# Patient Record
Sex: MALE | Race: WHITE | NOT HISPANIC OR LATINO | Employment: UNEMPLOYED | URBAN - METROPOLITAN AREA
[De-identification: names, ages, dates, MRNs, and addresses within clinical notes are randomized per-mention and may not be internally consistent; named-entity substitution may affect disease eponyms.]

---

## 2023-02-23 ENCOUNTER — TELEPHONE (OUTPATIENT)
Dept: FAMILY MEDICINE | Facility: CLINIC | Age: 63
End: 2023-02-23

## 2023-02-23 ENCOUNTER — VIRTUAL VISIT (OUTPATIENT)
Dept: FAMILY MEDICINE | Facility: CLINIC | Age: 63
End: 2023-02-23
Payer: MEDICAID

## 2023-02-23 DIAGNOSIS — R79.82 ELEVATED C-REACTIVE PROTEIN (CRP): ICD-10-CM

## 2023-02-23 DIAGNOSIS — M25.50 ARTHRALGIA, UNSPECIFIED JOINT: Primary | ICD-10-CM

## 2023-02-23 PROCEDURE — 99212 OFFICE O/P EST SF 10 MIN: CPT | Mod: VID | Performed by: FAMILY MEDICINE

## 2023-02-23 RX ORDER — NAPROXEN SODIUM 220 MG
220 TABLET ORAL 2 TIMES DAILY
COMMUNITY

## 2023-02-23 RX ORDER — LEVOTHYROXINE SODIUM 75 UG/1
75 TABLET ORAL DAILY
COMMUNITY
Start: 2023-02-09

## 2023-02-23 RX ORDER — IBUPROFEN 200 MG
400 TABLET ORAL EVERY 6 HOURS PRN
COMMUNITY

## 2023-02-23 NOTE — PROGRESS NOTES
Eder is a 62 year old who is being evaluated via a billable video visit.      How would you like to obtain your AVS? Mail a copy  If the video visit is dropped, the invitation should be resent by: Text to cell phone: [phone]  Will anyone else be joining your video visit? No          Assessment & Plan     Arthralgia, unspecified joint  Question palindromic rheumatism, seronegative RA  Referral to rheumatology    Elevated C-reactive protein (CRP)                     No follow-ups on file.    Derrick Lynch MD  [facility]    Carolyne Gaines is a 62 year old accompanied by his self, presenting for the following health issues:  Establish Care (Verbal consent given for video visit.  Establish care, discuss referral)      HPI     Patient wants to establish care.  He is seeking a referral to [facility] to see a rheumatologist.  He was being seen by Dr. Drew Hunt who originally placed a referral but [facility] declined it stating that Dr. Hunt isn't in their system.  He did have workup at the [facility] in [place], [place].    Patient has recent history of migratory arthralgias.  He has had a work-up done through [facility] in [facility].  His CRP is elevated.  There is also a diagnosis of subclinical hypothyroidism.  He is requesting referral to rheumatology.  This has been placed in the [facility] system but there is some troubles with insurance.      Review of Systems   Constitutional, HEENT, cardiovascular, pulmonary, gi and gu systems are negative, except as otherwise noted.      Objective           Vitals:  No vitals were obtained today due to virtual visit.    Physical Exam   GENERAL: Healthy, alert and no distress  EYES: Eyes grossly normal to inspection.  No discharge or erythema, or obvious scleral/conjunctival abnormalities.  RESP: No audible wheeze, cough, or visible cyanosis.  No visible retractions or increased work of breathing.    SKIN: Visible skin clear. No significant rash,  abnormal pigmentation or lesions.  NEURO: Cranial nerves grossly intact.  Mentation and speech appropriate for age.  PSYCH: Mentation appears normal, affect normal/bright, judgement and insight intact, normal speech and appearance well-groomed.                Video-Visit Details    Type of service:  Video Visit     Originating Location (pt. Location): Home    Distant Location (provider location):  On-site  Platform used for Video Visit: Brianne

## 2023-02-23 NOTE — TELEPHONE ENCOUNTER
03/23/23  Order/Referral Request    Who is requesting: Pt     Orders being requested: Rheumatology Referral    Reason service is needed/diagnosis: Pt has a referral placed but it is internal and pt wants this to be sent to [facility] in [place] - [phone]    When are orders needed by: asap, March 02 is patients appt with [facility]    Has this been discussed with Provider: Yes    Does patient have a preference on a Group/Provider/Facility? [facility] in [place]    Does patient have an appointment scheduled?: Yes: 03/02 at [facility]    Where to send orders: Fax    Okay to leave a detailed message?: Yes at Home number on file [phone] (home)

## 2023-02-27 ENCOUNTER — TELEPHONE (OUTPATIENT)
Dept: FAMILY MEDICINE | Facility: CLINIC | Age: 63
End: 2023-02-27
Payer: MEDICAID

## 2023-02-27 NOTE — TELEPHONE ENCOUNTER
Spoke with pt/Eder, went over his request for referral/order to be sent over to [facility]. I went ahead and sent the referral/order over.     I did recommend Eder to reach out to his insurance company to verify his coverage/benefits for services to [facility].    I also let Eder know that [facility] is out side the [facility] system/[facility] network. If his insurance states he requires an ins referral, we will not authorize one as we can provide services for him with in the [facility] system/[facility] Network. He will them pay out of network benefits with his insurance.    Eder stated his understanding and will reach out to his insurance company.

## 2023-02-27 NOTE — TELEPHONE ENCOUNTER
2-27-23  Pt called & is requesting his rheumatology order placed 2-23 to be faxed to [facility]